# Patient Record
Sex: MALE | Race: WHITE | NOT HISPANIC OR LATINO | Employment: UNEMPLOYED | URBAN - METROPOLITAN AREA
[De-identification: names, ages, dates, MRNs, and addresses within clinical notes are randomized per-mention and may not be internally consistent; named-entity substitution may affect disease eponyms.]

---

## 2017-04-12 ENCOUNTER — ALLSCRIPTS OFFICE VISIT (OUTPATIENT)
Dept: OTHER | Facility: OTHER | Age: 42
End: 2017-04-12

## 2017-12-22 DIAGNOSIS — M46.20 OSTEOMYELITIS OF VERTEBRA (HCC): ICD-10-CM

## 2018-01-11 NOTE — MISCELLANEOUS
Provider Comments  Provider Comments:   Dear Nestor Nunez,    You missed an appointment on 04/12/2017 at 10:15 AM  We understand that many situations arise that occasionally prevents patients from keeping scheduled appointments  It is the policy of [facility] that patients notify us 24 hours in advance if unable to keep a scheduled appointment  Missed appointments jeopardize strong physician-patient relationships  The appointment you missed could have easily been made available to another patient if you had contacted us to cancel  We like to accommodate all of our patients, but when patients miss an appointment it prevents us from being able to help everyone  In the future, we request at least 24 hours notice of cancellation so we can make your appointment available to someone else in need         Sincerely,    The Physicians and Staff of [facility]         Signatures   Electronically signed by : SOHA Garcia ; Apr 14 2017  4:25PM EST                       (Author)